# Patient Record
Sex: FEMALE | Race: WHITE | Employment: FULL TIME | URBAN - METROPOLITAN AREA
[De-identification: names, ages, dates, MRNs, and addresses within clinical notes are randomized per-mention and may not be internally consistent; named-entity substitution may affect disease eponyms.]

---

## 2021-02-01 ENCOUNTER — HOSPITAL ENCOUNTER (EMERGENCY)
Age: 56
Discharge: HOME | End: 2021-02-01
Payer: COMMERCIAL

## 2021-02-01 ENCOUNTER — APPOINTMENT (OUTPATIENT)
Dept: GENERAL RADIOLOGY | Age: 56
End: 2021-02-01
Payer: COMMERCIAL

## 2021-02-01 VITALS
HEART RATE: 73 BPM | OXYGEN SATURATION: 98 % | TEMPERATURE: 98.7 F | SYSTOLIC BLOOD PRESSURE: 156 MMHG | HEIGHT: 63 IN | DIASTOLIC BLOOD PRESSURE: 98 MMHG | WEIGHT: 152 LBS | BODY MASS INDEX: 26.93 KG/M2 | RESPIRATION RATE: 16 BRPM

## 2021-02-01 DIAGNOSIS — M25.551 RIGHT HIP PAIN: Primary | ICD-10-CM

## 2021-02-01 PROCEDURE — 73501 X-RAY EXAM HIP UNI 1 VIEW: CPT

## 2021-02-01 PROCEDURE — 99283 EMERGENCY DEPT VISIT LOW MDM: CPT

## 2021-02-01 RX ORDER — NAPROXEN 500 MG/1
500 TABLET ORAL 2 TIMES DAILY
Qty: 20 TABLET | Refills: 0 | Status: SHIPPED | OUTPATIENT
Start: 2021-02-01 | End: 2021-02-11

## 2021-02-01 RX ORDER — LISINOPRIL 20 MG/1
20 TABLET ORAL DAILY
COMMUNITY

## 2021-02-01 RX ORDER — CYCLOBENZAPRINE HCL 10 MG
10 TABLET ORAL 3 TIMES DAILY PRN
Qty: 20 TABLET | Refills: 0 | Status: SHIPPED | OUTPATIENT
Start: 2021-02-01 | End: 2021-02-08

## 2021-02-01 ASSESSMENT — PAIN DESCRIPTION - ORIENTATION: ORIENTATION: RIGHT

## 2021-02-01 ASSESSMENT — ENCOUNTER SYMPTOMS: GASTROINTESTINAL NEGATIVE: 1

## 2021-02-01 ASSESSMENT — PAIN DESCRIPTION - DESCRIPTORS: DESCRIPTORS: CRAMPING;SHARP

## 2021-02-01 NOTE — ED TRIAGE NOTES
Chief Complaint   Patient presents with    Hip Pain     pt c/o severe right hip pain x 2 days, unaware of any injury

## 2021-02-01 NOTE — ED NOTES
Discharge instructions reviewed with patient. Patient denies any questions or concerns at this time. Patient ambulatory out of emergency department.       Christa Mena RN  02/01/21 3689

## 2021-02-01 NOTE — ED PROVIDER NOTES
[address] [facility]  EMERGENCY DEPARTMENT ENCOUNTER        Pt Name: Lily Nunez  MRN: [id number]  [place] 1965  Date of evaluation: 2/1/2021  Provider: Viviana Hennessy PA-C  PCP: No primary care provider on file. GOMEZ. I have evaluated this patient. My supervising physician was available for consultation. CHIEF COMPLAINT       Chief Complaint   Patient presents with    Hip Pain     pt c/o severe right hip pain x 2 days, unaware of any injury       HISTORY OF PRESENT ILLNESS   (Location, Timing/Onset, Context/Setting, Quality, Duration, Modifying Factors, Severity, Associated Signs and Symptoms)  Note limiting factors. Lily Nunez is a 54 y.o. female past medical history of arthritis, hyperlipidemia, hypertension brought in today by private vehicle for complaints of right hip pain for the past 2 days. Patient denies any injury or trauma to the right hip. Denies any heavy lifting twisting or bending. Onset of symptoms x2 days. Duration symptoms have been persistent since onset. Context includes right hip pain without evidence of injury or trauma to the right hip. Describes the pain as \"a deep pain\". Denies numbness or tingling or bowel or bladder incontinence. Rates pain a 4 out of 10. No radiation of pain. She has been taking Tylenol at home with some relief of symptoms. No aggravating complaints. No alleviating complaints. She otherwise denies any other complaints at this time. She has not tried anything else at this time for symptomatic relief. Nursing Notes were all reviewed and agreed with or any disagreements were addressed in the HPI. REVIEW OF SYSTEMS    (2-9 systems for level 4, 10 or more for level 5)     Review of Systems   Constitutional: Negative. Gastrointestinal: Negative. Genitourinary: Negative. Musculoskeletal: Positive for arthralgias. Skin: Negative. Neurological: Negative. Negative for numbness. Positives and Pertinent negatives as per HPI. Except as noted above in the ROS, all other systems were reviewed and negative. PAST MEDICAL HISTORY     Past Medical History:   Diagnosis Date    Arthritis     Hyperlipidemia     Hypertension          SURGICAL HISTORY     Past Surgical History:   Procedure Laterality Date    DILATION AND CURETTAGE OF UTERUS      HYSTERECTOMY      TONSILLECTOMY           CURRENTMEDICATIONS       Previous Medications    LISINOPRIL (PRINIVIL;ZESTRIL) 20 MG TABLET    Take 20 mg by mouth daily         ALLERGIES     Patient has no known allergies. FAMILYHISTORY     History reviewed. No pertinent family history. SOCIAL HISTORY       Social History     Tobacco Use    Smoking status: Never Smoker    Smokeless tobacco: Never Used   Substance Use Topics    Alcohol use: Yes     Comment: occ    Drug use: Never       SCREENINGS             PHYSICAL EXAM    (up to 7 for level 4, 8 or more for level 5)     ED Triage Vitals [02/01/21 1118]   BP Temp Temp Source Pulse Resp SpO2 Height Weight   (!) 153/90 97.8 °F (36.6 °C) Oral 79 16 100 % 5' 3\" (1.6 m) 152 lb (68.9 kg)       Physical Exam  Vitals signs and nursing note reviewed. Constitutional:       General: She is awake. She is not in acute distress. Appearance: Normal appearance. She is well-developed. She is not ill-appearing, toxic-appearing or diaphoretic. HENT:      Head: Normocephalic and atraumatic. Nose: Nose normal.   Eyes:      General:         Right eye: No discharge. Left eye: No discharge. Neck:      Musculoskeletal: Normal range of motion and neck supple. Cardiovascular:      Rate and Rhythm: Normal rate and regular rhythm. Pulses:           Radial pulses are 2+ on the right side and 2+ on the left side. Heart sounds: Normal heart sounds. No murmur. No gallop. Pulmonary:      Effort: Pulmonary effort is normal. No respiratory distress. Breath sounds: Normal breath sounds. No decreased breath sounds, wheezing, rhonchi or rales. Chest:      Chest wall: No tenderness. Musculoskeletal: Normal range of motion. General: No deformity. Right hip: She exhibits tenderness. She exhibits normal range of motion, normal strength, no bony tenderness, no swelling, no crepitus, no deformity and no laceration. Right knee: Normal. She exhibits normal range of motion, no swelling, no effusion, no ecchymosis, no deformity, no laceration, no erythema, normal alignment, no LCL laxity, normal patellar mobility, no bony tenderness, normal meniscus and no MCL laxity. No tenderness found. No medial joint line, no lateral joint line, no MCL, no LCL and no patellar tendon tenderness noted. Right ankle: Normal. Achilles tendon normal. Achilles tendon exhibits no pain, no defect and normal Banerjee's test results. Lumbar back: She exhibits normal range of motion, no tenderness, no bony tenderness, no swelling, no edema, no deformity, no laceration, no pain, no spasm and normal pulse. Right lower leg: No edema. Left lower leg: No edema. Comments: Dorsiflexion and plantarflexion intact. Flexion extension as well as internal and external rotation intact to the hips and the knees bilaterally. Sensation intact in L4-L5 and S1 and symmetric. No skin changes color streaking or ecchymosis noted to the right hip or right lower extremity. Neurovascularly intact. Distal pulses +2 and symmetric. Negative straight leg raise bilaterally. Skin:     General: Skin is warm and dry. Neurological:      General: No focal deficit present. Mental Status: She is alert and oriented to person, place, and time. GCS: GCS eye subscore is 4. GCS verbal subscore is 5. GCS motor subscore is 6. Deep Tendon Reflexes:      Reflex Scores:       Patellar reflexes are 2+ on the right side and 2+ on the left side.   Psychiatric: Behavior: Behavior normal. Behavior is cooperative. DIAGNOSTIC RESULTS   LABS:    Labs Reviewed - No data to display    All other labs were within normal range or not returned as of this dictation. EKG: All EKG's are interpreted by the Emergency Department Physician in the absence of a cardiologist.  Please see their note for interpretation of EKG. RADIOLOGY:   Non-plain film images such as CT, Ultrasound and MRI are read by the radiologist. Plain radiographic images are visualized and preliminarily interpreted by the ED Provider with the below findings:        Interpretation per the Radiologist below, if available at the time of this note:    XR HIP 1 VW W PELVIS RIGHT   Final Result   Negative radiographs of the pelvis and right hip. Xr Hip 1 Vw W Pelvis Right    Result Date: 2/1/2021  EXAMINATION: ONE XRAY VIEW OF THE PELVIS AND TWO XRAY VIEWS RIGHT HIP 2/1/2021 12:37 pm COMPARISON: None. HISTORY: ORDERING SYSTEM PROVIDED HISTORY: right hip pain TECHNOLOGIST PROVIDED HISTORY: Reason for exam:->right hip pain Reason for Exam: Hip Pain (pt c/o severe right hip pain x 2 days, unaware of any injury Acuity: Acute Type of Exam: Initial FINDINGS: The bony structures, soft tissues and joints appear within normal limits throughout. No fracture demonstrated, and no dislocation. Joint spacing within normal limits. No significant degenerative change or other significant finding. Negative radiographs of the pelvis and right hip.            PROCEDURES   Unless otherwise noted below, none     Procedures    CRITICAL CARE TIME   N/A    CONSULTS:  None      EMERGENCY DEPARTMENT COURSE and DIFFERENTIAL DIAGNOSIS/MDM:   Vitals:    Vitals:    02/01/21 1118 02/01/21 1310   BP: (!) 153/90 (!) 156/98   Pulse: 79 73   Resp: 16 16   Temp: 97.8 °F (36.6 °C) 98.7 °F (37.1 °C)   TempSrc: Oral Oral   SpO2: 100% 98%   Weight: 152 lb (68.9 kg)    Height: 5' 3\" (1.6 m) Patient was given the following medications:  Medications - No data to display        Patient brought in today by private vehicle for complaints of right hip pain without injury or trauma to the right hip. On exam patient is alert oriented afebrile breathing on room air satting at 98%. Nontoxic in appearance no acute respiratory distress. Old labs and records reviewed at this time. Patient seen by myself and my attending was available for consultation. X-ray of the hip and pelvis is unremarkable. Bony structures soft tissues and joints appear within normal limits. No fracture or dislocation noted. Joint spaces within normal limits. No significant degenerative change or other significant findings. Patient while in the ED did not want anything for pain control. Plan at this time will be to follow-up with orthopedics. Patient given their contact phone number and instructions to call in the next 1 day to schedule a follow-up appointment. She was discharged with anti-inflammatories and Flexeril for home. Patient told to return immediately to the ED with any new or worsening symptoms including but not limited to increased pain, numbness or tingling bowel or bladder incontinence or any new or worsening symptoms. She verbalized understand this plan was comfortable and stable at time of discharge. I did feel comfortable sending this patient home with close follow-up instructions and strict return precautions. Patient was stable at this time. FINAL IMPRESSION      1.  Right hip pain          DISPOSITION/PLAN   DISPOSITION Decision To Discharge 02/01/2021 01:13:50 PM      PATIENT REFERREDTO:  [place] [facility]  [address] [facility]  [phone]  Schedule an appointment as soon as possible for a visit   As needed, If symptoms worsen      DISCHARGE MEDICATIONS:  New Prescriptions CYCLOBENZAPRINE (FLEXERIL) 10 MG TABLET    Take 1 tablet by mouth 3 times daily as needed for Muscle spasms    NAPROXEN (NAPROSYN) 500 MG TABLET    Take 1 tablet by mouth 2 times daily for 20 doses       DISCONTINUED MEDICATIONS:  Discontinued Medications    No medications on file              (Please note that portions of this note were completed with a voice recognition program.  Efforts were made to edit the dictations but occasionally words are mis-transcribed.)    Freddie Baig PA-C (electronically signed)            Freddie Baig PA-C  02/01/21 2777

## 2021-02-05 ENCOUNTER — TELEPHONE (OUTPATIENT)
Dept: EMERGENCY DEPT | Age: 56
End: 2021-02-05

## 2021-02-05 NOTE — TELEPHONE ENCOUNTER
The patient did not answer MSM attempt for a telephonic welness check. Attempted to reach pt twice, left a vm extending the PCP referral line.